# Patient Record
Sex: FEMALE | Race: WHITE | NOT HISPANIC OR LATINO | Employment: OTHER | URBAN - METROPOLITAN AREA
[De-identification: names, ages, dates, MRNs, and addresses within clinical notes are randomized per-mention and may not be internally consistent; named-entity substitution may affect disease eponyms.]

---

## 2023-10-23 DIAGNOSIS — E55.9 VITAMIN D DEFICIENCY: ICD-10-CM

## 2023-10-23 DIAGNOSIS — Z00.00 WELLNESS EXAMINATION: ICD-10-CM

## 2023-10-23 DIAGNOSIS — R79.9 ABNORMAL BLOOD CHEMISTRY: ICD-10-CM

## 2023-10-24 ENCOUNTER — LAB (OUTPATIENT)
Dept: LAB | Facility: LAB | Age: 78
End: 2023-10-24
Payer: MEDICARE

## 2023-10-24 DIAGNOSIS — E55.9 VITAMIN D DEFICIENCY: ICD-10-CM

## 2023-10-24 DIAGNOSIS — Z00.00 WELLNESS EXAMINATION: ICD-10-CM

## 2023-10-24 DIAGNOSIS — R79.9 ABNORMAL BLOOD CHEMISTRY: ICD-10-CM

## 2023-10-24 LAB
25(OH)D3 SERPL-MCNC: 88 NG/ML (ref 30–100)
ALT SERPL W P-5'-P-CCNC: 14 U/L (ref 7–45)
ANION GAP SERPL CALC-SCNC: 13 MMOL/L (ref 10–20)
APPEARANCE UR: CLEAR
BILIRUB UR STRIP.AUTO-MCNC: NEGATIVE MG/DL
BUN SERPL-MCNC: 15 MG/DL (ref 6–23)
CALCIUM SERPL-MCNC: 9.6 MG/DL (ref 8.6–10.6)
CHLORIDE SERPL-SCNC: 102 MMOL/L (ref 98–107)
CHOLEST SERPL-MCNC: 222 MG/DL (ref 0–199)
CHOLESTEROL/HDL RATIO: 2.5
CO2 SERPL-SCNC: 30 MMOL/L (ref 21–32)
COLOR UR: YELLOW
CREAT SERPL-MCNC: 0.68 MG/DL (ref 0.5–1.05)
ERYTHROCYTE [DISTWIDTH] IN BLOOD BY AUTOMATED COUNT: 13.4 % (ref 11.5–14.5)
EST. AVERAGE GLUCOSE BLD GHB EST-MCNC: 120 MG/DL
GFR SERPL CREATININE-BSD FRML MDRD: 89 ML/MIN/1.73M*2
GLUCOSE SERPL-MCNC: 92 MG/DL (ref 74–99)
GLUCOSE UR STRIP.AUTO-MCNC: NEGATIVE MG/DL
HBA1C MFR BLD: 5.8 %
HCT VFR BLD AUTO: 38.9 % (ref 36–46)
HDLC SERPL-MCNC: 88.3 MG/DL
HGB BLD-MCNC: 12.3 G/DL (ref 12–16)
KETONES UR STRIP.AUTO-MCNC: NEGATIVE MG/DL
LDLC SERPL CALC-MCNC: 123 MG/DL
LEUKOCYTE ESTERASE UR QL STRIP.AUTO: NEGATIVE
MCH RBC QN AUTO: 28.2 PG (ref 26–34)
MCHC RBC AUTO-ENTMCNC: 31.6 G/DL (ref 32–36)
MCV RBC AUTO: 89 FL (ref 80–100)
NITRITE UR QL STRIP.AUTO: NEGATIVE
NON HDL CHOLESTEROL: 134 MG/DL (ref 0–149)
NRBC BLD-RTO: 0 /100 WBCS (ref 0–0)
PH UR STRIP.AUTO: 7 [PH]
PLATELET # BLD AUTO: 198 X10*3/UL (ref 150–450)
PMV BLD AUTO: 11.4 FL (ref 7.5–11.5)
POTASSIUM SERPL-SCNC: 4.9 MMOL/L (ref 3.5–5.3)
PROT UR STRIP.AUTO-MCNC: NEGATIVE MG/DL
RBC # BLD AUTO: 4.36 X10*6/UL (ref 4–5.2)
RBC # UR STRIP.AUTO: NEGATIVE /UL
SODIUM SERPL-SCNC: 140 MMOL/L (ref 136–145)
SP GR UR STRIP.AUTO: 1.01
TRIGL SERPL-MCNC: 53 MG/DL (ref 0–149)
TSH SERPL-ACNC: 1.87 MIU/L (ref 0.44–3.98)
UROBILINOGEN UR STRIP.AUTO-MCNC: <2 MG/DL
VIT B12 SERPL-MCNC: 272 PG/ML (ref 211–911)
VLDL: 11 MG/DL (ref 0–40)
WBC # BLD AUTO: 4.6 X10*3/UL (ref 4.4–11.3)

## 2023-10-24 PROCEDURE — 83036 HEMOGLOBIN GLYCOSYLATED A1C: CPT

## 2023-10-24 PROCEDURE — 80061 LIPID PANEL: CPT

## 2023-10-24 PROCEDURE — 84460 ALANINE AMINO (ALT) (SGPT): CPT

## 2023-10-24 PROCEDURE — 81003 URINALYSIS AUTO W/O SCOPE: CPT

## 2023-10-24 PROCEDURE — 82306 VITAMIN D 25 HYDROXY: CPT

## 2023-10-24 PROCEDURE — 84443 ASSAY THYROID STIM HORMONE: CPT

## 2023-10-24 PROCEDURE — 36415 COLL VENOUS BLD VENIPUNCTURE: CPT

## 2023-10-24 PROCEDURE — 85027 COMPLETE CBC AUTOMATED: CPT

## 2023-10-24 PROCEDURE — 80048 BASIC METABOLIC PNL TOTAL CA: CPT

## 2023-10-24 PROCEDURE — 82607 VITAMIN B-12: CPT

## 2023-10-27 RX ORDER — ACETAMINOPHEN 500 MG
TABLET ORAL
COMMUNITY
Start: 2021-06-28

## 2023-10-27 RX ORDER — CALCIUM CARBONATE 600 MG
TABLET ORAL
COMMUNITY
Start: 2021-06-28 | End: 2023-10-31 | Stop reason: WASHOUT

## 2023-10-30 ENCOUNTER — OFFICE VISIT (OUTPATIENT)
Dept: PRIMARY CARE | Facility: CLINIC | Age: 78
End: 2023-10-30
Payer: MEDICARE

## 2023-10-30 VITALS
WEIGHT: 121.2 LBS | TEMPERATURE: 97.5 F | DIASTOLIC BLOOD PRESSURE: 68 MMHG | RESPIRATION RATE: 16 BRPM | SYSTOLIC BLOOD PRESSURE: 110 MMHG | HEART RATE: 68 BPM | BODY MASS INDEX: 20.69 KG/M2 | HEIGHT: 64 IN | OXYGEN SATURATION: 98 %

## 2023-10-30 DIAGNOSIS — Z12.31 ENCOUNTER FOR SCREENING MAMMOGRAM FOR MALIGNANT NEOPLASM OF BREAST: ICD-10-CM

## 2023-10-30 DIAGNOSIS — Z00.00 WELL ADULT EXAM: Primary | ICD-10-CM

## 2023-10-30 DIAGNOSIS — Z12.39 SCREENING BREAST EXAMINATION: ICD-10-CM

## 2023-10-30 PROCEDURE — 1159F MED LIST DOCD IN RCRD: CPT | Performed by: INTERNAL MEDICINE

## 2023-10-30 PROCEDURE — G0439 PPPS, SUBSEQ VISIT: HCPCS | Performed by: INTERNAL MEDICINE

## 2023-10-30 PROCEDURE — 1170F FXNL STATUS ASSESSED: CPT | Performed by: INTERNAL MEDICINE

## 2023-10-30 PROCEDURE — 1036F TOBACCO NON-USER: CPT | Performed by: INTERNAL MEDICINE

## 2023-10-30 ASSESSMENT — ACTIVITIES OF DAILY LIVING (ADL)
DRESSING: INDEPENDENT
DOING_HOUSEWORK: INDEPENDENT
TAKING_MEDICATION: INDEPENDENT
MANAGING_FINANCES: INDEPENDENT
GROCERY_SHOPPING: INDEPENDENT
BATHING: INDEPENDENT

## 2023-10-30 ASSESSMENT — PATIENT HEALTH QUESTIONNAIRE - PHQ9
10. IF YOU CHECKED OFF ANY PROBLEMS, HOW DIFFICULT HAVE THESE PROBLEMS MADE IT FOR YOU TO DO YOUR WORK, TAKE CARE OF THINGS AT HOME, OR GET ALONG WITH OTHER PEOPLE: NOT DIFFICULT AT ALL
1. LITTLE INTEREST OR PLEASURE IN DOING THINGS: NOT AT ALL
8. MOVING OR SPEAKING SO SLOWLY THAT OTHER PEOPLE COULD HAVE NOTICED. OR THE OPPOSITE, BEING SO FIGETY OR RESTLESS THAT YOU HAVE BEEN MOVING AROUND A LOT MORE THAN USUAL: NOT AT ALL
4. FEELING TIRED OR HAVING LITTLE ENERGY: NOT AT ALL
5. POOR APPETITE OR OVEREATING: NOT AT ALL
9. THOUGHTS THAT YOU WOULD BE BETTER OFF DEAD, OR OF HURTING YOURSELF: NOT AT ALL
7. TROUBLE CONCENTRATING ON THINGS, SUCH AS READING THE NEWSPAPER OR WATCHING TELEVISION: NOT AT ALL
3. TROUBLE FALLING OR STAYING ASLEEP OR SLEEPING TOO MUCH: NOT AT ALL
6. FEELING BAD ABOUT YOURSELF - OR THAT YOU ARE A FAILURE OR HAVE LET YOURSELF OR YOUR FAMILY DOWN: NOT AT ALL
SUM OF ALL RESPONSES TO PHQ9 QUESTIONS 1 AND 2: 0
SUM OF ALL RESPONSES TO PHQ QUESTIONS 1-9: 0
2. FEELING DOWN, DEPRESSED OR HOPELESS: NOT AT ALL

## 2023-10-30 ASSESSMENT — ENCOUNTER SYMPTOMS
OCCASIONAL FEELINGS OF UNSTEADINESS: 0
LOSS OF SENSATION IN FEET: 0
DEPRESSION: 0

## 2023-10-30 NOTE — PROGRESS NOTES
Patient is here for her annual medicare wellness exam , has some hereditary concerns with a hole in her aorta , brother was recently diagnosed , interested in varicose veins being handled, and has a pain that comes and goes up back side of thigh sometimes hard to stand but does exercise frequently. Wants to discuss white blood count in labs, states it was elevated   Currently not on any prescribed medications   No Pain

## 2023-10-30 NOTE — PROGRESS NOTES
"Subjective   Patient ID: Danette Owens is a 78 y.o. female who presents for Medicare Annual Wellness Visit Subsequent.  Patient comes in for a physical examination, doing well over - all with no particular complaints.   Also in for laboratory review and health maintenance update.  Updating family history as well.          Review of Systems   All other systems reviewed and are negative.      Objective   Physical Exam  Constitutional:       Appearance: Normal appearance.   HENT:      Head: Normocephalic.   Eyes:      Extraocular Movements: Extraocular movements intact.      Conjunctiva/sclera: Conjunctivae normal.      Pupils: Pupils are equal, round, and reactive to light.   Cardiovascular:      Rate and Rhythm: Normal rate and regular rhythm.   Pulmonary:      Effort: Pulmonary effort is normal.      Breath sounds: Normal breath sounds.   Abdominal:      General: Abdomen is flat. Bowel sounds are normal.      Palpations: Abdomen is soft.   Musculoskeletal:         General: Normal range of motion.      Cervical back: Normal range of motion.   Skin:     General: Skin is warm and dry.   Neurological:      General: No focal deficit present.      Mental Status: She is alert and oriented to person, place, and time. Mental status is at baseline.   Psychiatric:         Mood and Affect: Mood normal.         Behavior: Behavior normal.       /68 (BP Location: Right arm)   Pulse 68   Temp 36.4 °C (97.5 °F)   Resp 16   Ht 1.626 m (5' 4\")   Wt 55 kg (121 lb 3.2 oz)   SpO2 98%   BMI 20.80 kg/m²         Assessment/Plan   Problem List Items Addressed This Visit    None       ASSESSMENT AND PLAN: Patient on examination is in fair health except for medical conditions discussed above, obtained screening blood tests to screen for high cholesterol, diabetes, thyroid, Ekg if above 50 years old or earlier if with cardiac history. Patient should be taking enough calcium in a balanced diet  Weight control especially if BMI is " above ideal range. For Female Patients Only:  Mammogram yearly and PAP test with gynecology unless s/p Total hysterectomy  Bone density test at age 60 and above and every two years after that. Preventive Medicine: colon cancer screening by age 45 if no family history as well PSA @ 50 , balanced diet, and exercise as discussed. Seat belt use for injury prevention, living will. Substance use and /or tobacco use counseled when applicable. Alcohol use discussed, use designated . Immunizations TD age 50 and every 10 years. Pneumovax and shingles vaccine counseled. Yearly flu vaccine unless contraindicated. More than 50% of office visit time spent counseling the patient, questions were answered. If problems arise, patient is to call or come back in. It is understood that the responsibility of healthcare is shared by the patient by following a healthy lifestyle and following the plan above as discussed. Advised complete physical examination every year. Pending additional results, may need to come for a return office visit for follow-up.

## 2023-10-31 ENCOUNTER — TELEPHONE (OUTPATIENT)
Dept: PRIMARY CARE | Facility: CLINIC | Age: 78
End: 2023-10-31
Payer: MEDICARE

## 2023-10-31 NOTE — TELEPHONE ENCOUNTER
Patient called in to let you know her brother, has a hole in the stomach area not chest area, Advise?

## 2023-11-06 ENCOUNTER — ANCILLARY PROCEDURE (OUTPATIENT)
Dept: RADIOLOGY | Facility: CLINIC | Age: 78
End: 2023-11-06
Payer: MEDICARE

## 2023-11-06 DIAGNOSIS — Z12.31 ENCOUNTER FOR SCREENING MAMMOGRAM FOR MALIGNANT NEOPLASM OF BREAST: ICD-10-CM

## 2023-11-06 DIAGNOSIS — Z12.39 SCREENING BREAST EXAMINATION: ICD-10-CM

## 2023-11-06 PROCEDURE — 77067 SCR MAMMO BI INCL CAD: CPT

## 2023-11-06 PROCEDURE — 77063 BREAST TOMOSYNTHESIS BI: CPT | Performed by: STUDENT IN AN ORGANIZED HEALTH CARE EDUCATION/TRAINING PROGRAM

## 2023-11-06 PROCEDURE — 77067 SCR MAMMO BI INCL CAD: CPT | Performed by: STUDENT IN AN ORGANIZED HEALTH CARE EDUCATION/TRAINING PROGRAM

## 2023-11-08 PROBLEM — U07.1 COVID-19 VIRUS DETECTED: Status: ACTIVE | Noted: 2023-11-08

## 2023-11-08 PROBLEM — E03.9 ACQUIRED HYPOTHYROIDISM: Status: ACTIVE | Noted: 2023-11-08

## 2023-11-08 PROBLEM — H40.003 GLAUCOMA SUSPECT OF BOTH EYES: Status: ACTIVE | Noted: 2022-01-31

## 2023-11-08 PROBLEM — N39.0 URINARY TRACT INFECTION: Status: ACTIVE | Noted: 2023-11-08

## 2023-11-08 PROBLEM — R11.2 NAUSEA & VOMITING: Status: ACTIVE | Noted: 2023-11-08

## 2023-11-08 PROBLEM — R55 VASOVAGAL SYNCOPE: Status: ACTIVE | Noted: 2023-11-08

## 2023-11-08 PROBLEM — K63.5 COLON POLYP: Status: ACTIVE | Noted: 2023-11-08

## 2023-11-08 PROBLEM — Z96.1 PSEUDOPHAKIA OF BOTH EYES: Status: ACTIVE | Noted: 2019-09-17

## 2023-11-08 RX ORDER — CALCIUM CARBONATE 500(1250)
TABLET ORAL
COMMUNITY

## 2023-11-09 ENCOUNTER — OFFICE VISIT (OUTPATIENT)
Dept: VASCULAR SURGERY | Facility: CLINIC | Age: 78
End: 2023-11-09
Payer: MEDICARE

## 2023-11-09 VITALS
BODY MASS INDEX: 20.59 KG/M2 | SYSTOLIC BLOOD PRESSURE: 117 MMHG | DIASTOLIC BLOOD PRESSURE: 66 MMHG | OXYGEN SATURATION: 98 % | HEART RATE: 72 BPM | WEIGHT: 120.6 LBS | HEIGHT: 64 IN

## 2023-11-09 DIAGNOSIS — I83.811 VARICOSE VEINS OF RIGHT LOWER EXTREMITY WITH PAIN: Primary | ICD-10-CM

## 2023-11-09 DIAGNOSIS — Z00.00 WELL ADULT EXAM: ICD-10-CM

## 2023-11-09 PROCEDURE — 99204 OFFICE O/P NEW MOD 45 MIN: CPT | Performed by: SURGERY

## 2023-11-09 PROCEDURE — 1036F TOBACCO NON-USER: CPT | Performed by: SURGERY

## 2023-11-09 PROCEDURE — 99214 OFFICE O/P EST MOD 30 MIN: CPT | Performed by: SURGERY

## 2023-11-09 PROCEDURE — 1159F MED LIST DOCD IN RCRD: CPT | Performed by: SURGERY

## 2023-11-09 PROCEDURE — 1126F AMNT PAIN NOTED NONE PRSNT: CPT | Performed by: SURGERY

## 2023-11-09 ASSESSMENT — PATIENT HEALTH QUESTIONNAIRE - PHQ9
1. LITTLE INTEREST OR PLEASURE IN DOING THINGS: NOT AT ALL
2. FEELING DOWN, DEPRESSED OR HOPELESS: NOT AT ALL
SUM OF ALL RESPONSES TO PHQ9 QUESTIONS 1 AND 2: 0

## 2023-11-09 ASSESSMENT — COLUMBIA-SUICIDE SEVERITY RATING SCALE - C-SSRS
6. HAVE YOU EVER DONE ANYTHING, STARTED TO DO ANYTHING, OR PREPARED TO DO ANYTHING TO END YOUR LIFE?: NO
2. HAVE YOU ACTUALLY HAD ANY THOUGHTS OF KILLING YOURSELF?: NO
1. IN THE PAST MONTH, HAVE YOU WISHED YOU WERE DEAD OR WISHED YOU COULD GO TO SLEEP AND NOT WAKE UP?: NO

## 2023-11-09 ASSESSMENT — ENCOUNTER SYMPTOMS
LOSS OF SENSATION IN FEET: 0
DEPRESSION: 0
OCCASIONAL FEELINGS OF UNSTEADINESS: 0

## 2023-11-09 ASSESSMENT — PAIN SCALES - GENERAL: PAINLEVEL: 0-NO PAIN

## 2023-11-09 NOTE — PROGRESS NOTES
NPV REASON: right leg sx varicose veins; questions re: fam hx of aaa    CURRENT ENCOUNTER:  Danette Owens is 78 y.o. female here for follow up of right leg sx varicose veins; questions re: fam hx of aaa.    3 children and vv worsened with each pregnancy - all went well  Has pain along vv - worse after standing for a prolonged period  No ankle edema, but the veins bulge over the day  Does wear compression and does help  Aching and throbbing - comes on very quickly after just 10-15 minutes of standing  No prior intervention  No h/o thrombosis  No pelvic pain  No prior work up for the legs.   No issues with left leg  Very activity  - walks for about 4 miles a day/bike rides in stationary bikes; yoga - on a daily basis.     +famhx of (brother) 3.5cm aaa -   No other fm hx that she is aware of.       Past Medical History:   Diagnosis Date    Encounter for gynecological examination (general) (routine) without abnormal findings     Encounter for gynecological examination without abnormal finding    Encounter for screening mammogram for malignant neoplasm of breast     Visit for screening mammogram       Meds:   Current Outpatient Medications:     calcium carbonate (Oscal) 500 mg calcium (1,250 mg) tablet, Take by mouth. Calcium 600 mg tabs  take per diected, Disp: , Rfl:     cholecalciferol (Vitamin D-3) 50 mcg (2,000 unit) capsule, Take by mouth., Disp: , Rfl:     Allergies:   Allergies   Allergen Reactions    Sulfa (Sulfonamide Antibiotics) Swelling       ROS:  Review of Systems otherwise unremarkable.    Objective:  Vitals:  Vitals:    11/09/23 1428   BP: 117/66   Pulse: 72   SpO2: 98%        Exam:  radial pulses normal, aorta normal in size without enlargement, femoral artery pulses normal, varicose veins noted, pedal pulses normal both DP's and PT's, color, temperature, sensation in feet normal, no lesions                  Labs:  Lab Results   Component Value Date    WBC 4.6 10/24/2023    WBC 3.9 (L) 06/24/2021    HGB  12.3 10/24/2023    HGB 12.9 06/24/2021    HCT 38.9 10/24/2023    HCT 39.7 06/24/2021    MCV 89 10/24/2023    MCV 90 06/24/2021     10/24/2023     Lab Results   Component Value Date    CREATININE 0.68 10/24/2023    CREATININE 0.61 06/24/2021    BUN 15 10/24/2023    BUN 11 06/24/2021     10/24/2023     06/24/2021    K 4.9 10/24/2023    K 3.9 06/24/2021     10/24/2023     06/24/2021    CO2 30 10/24/2023    CO2 31 06/24/2021         Assessment & Plan:  Danette Owens is 78 y.o. female here for follow up of right leg sx varicose veins; questions re: fam hx of aaa.    RIGHT LEG C2 Varicose Veins  RIGHT LEG PAIN 2, VARICOSE VEINS 3, and USE OF COMPRESSION 3  RIGHT LEG CEAP: C2, SX  RIGHT LEG VCSS SCORE: 8    Left leg NO sx.  Excellent compliance with compression therapy and very active.     1) we will proceed with a right leg venous insufficiency study and then have a virtual visit to discuss results and any potential therapies.   2) we will also get a screening AAA ultrasound given your family history of AAA       Corina Jama MD, MHS, RPVI  , [facility] [employer]  Director, Center for Comprehensive Venous Care, [facility] [place]  Co-Director, Vascular Laboratories, [facility] [place]  Division of Vascular Surgery and Endovascular Therapy  [facility]

## 2023-11-09 NOTE — PATIENT INSTRUCTIONS
It was a pleasure taking care of you today and appreciate your seeing us at our [facility] [place] [facility] - Center for Comprehensive Venous Care    Based on your leg symptoms, venous insufficiency studies and potential for clinical improvement, I am recommeding the followin) we will proceed with a right leg venous insufficiency study and then have a virtual visit to discuss results and any potential therapies.   2) we will also get a screening AAA ultrasound given your family history of AAA     Please call the office with any questions at [phone].   For Vein Center specific questions, particularly insurance related questions of booking your Scheller intervention, you can call [phone] or email at [email or web address]    You can speak directly to my Vein Center Nurse Coordinator, Annie Mae, for specific questions.       Corina Jama MD, MHS, RPVI  , [facility] [employer]  Director, Center for Comprehensive Venous Care, [facility] [place]  Co-Director, Vascular Laboratories, [facility] [place]  Division of Vascular Surgery and Endovascular Therapy  [facility]

## 2023-11-15 ENCOUNTER — HOSPITAL ENCOUNTER (OUTPATIENT)
Dept: RADIOLOGY | Facility: EXTERNAL LOCATION | Age: 78
Discharge: HOME | End: 2023-11-15
Payer: MEDICARE

## 2023-12-05 ENCOUNTER — HOSPITAL ENCOUNTER (OUTPATIENT)
Dept: VASCULAR MEDICINE | Facility: HOSPITAL | Age: 78
Discharge: HOME | End: 2023-12-05
Payer: MEDICARE

## 2023-12-05 DIAGNOSIS — Z00.00 WELL ADULT EXAM: ICD-10-CM

## 2023-12-05 DIAGNOSIS — Z13.6 ENCOUNTER FOR SCREENING FOR CARDIOVASCULAR DISORDERS: ICD-10-CM

## 2023-12-05 DIAGNOSIS — I83.811 VARICOSE VEINS OF RIGHT LOWER EXTREMITY WITH PAIN: ICD-10-CM

## 2023-12-05 PROCEDURE — 76706 US ABDL AORTA SCREEN AAA: CPT | Performed by: INTERNAL MEDICINE

## 2023-12-05 PROCEDURE — 93971 EXTREMITY STUDY: CPT

## 2023-12-05 PROCEDURE — 76706 US ABDL AORTA SCREEN AAA: CPT

## 2023-12-05 PROCEDURE — 93971 EXTREMITY STUDY: CPT | Performed by: INTERNAL MEDICINE

## 2024-03-14 ENCOUNTER — TELEMEDICINE (OUTPATIENT)
Dept: VASCULAR SURGERY | Facility: CLINIC | Age: 79
End: 2024-03-14
Payer: MEDICARE

## 2024-03-14 DIAGNOSIS — I83.811 VARICOSE VEINS OF RIGHT LOWER EXTREMITY WITH PAIN: Primary | ICD-10-CM

## 2024-03-14 PROCEDURE — 99442 PR PHYS/QHP TELEPHONE EVALUATION 11-20 MIN: CPT | Performed by: SURGERY

## 2024-03-14 PROCEDURE — 1036F TOBACCO NON-USER: CPT | Performed by: SURGERY

## 2024-03-14 PROCEDURE — 1159F MED LIST DOCD IN RCRD: CPT | Performed by: SURGERY

## 2024-03-14 ASSESSMENT — COLUMBIA-SUICIDE SEVERITY RATING SCALE - C-SSRS
1. IN THE PAST MONTH, HAVE YOU WISHED YOU WERE DEAD OR WISHED YOU COULD GO TO SLEEP AND NOT WAKE UP?: NO
6. HAVE YOU EVER DONE ANYTHING, STARTED TO DO ANYTHING, OR PREPARED TO DO ANYTHING TO END YOUR LIFE?: NO
2. HAVE YOU ACTUALLY HAD ANY THOUGHTS OF KILLING YOURSELF?: NO

## 2024-03-14 ASSESSMENT — PATIENT HEALTH QUESTIONNAIRE - PHQ9
2. FEELING DOWN, DEPRESSED OR HOPELESS: NOT AT ALL
SUM OF ALL RESPONSES TO PHQ9 QUESTIONS 1 AND 2: 0
1. LITTLE INTEREST OR PLEASURE IN DOING THINGS: NOT AT ALL

## 2024-03-14 NOTE — PROGRESS NOTES
F/U REASON: follow up on VI studies for right leg sx varicose veins    CURRENT ENCOUNTER:  Danette Owens is 79 y.o. female here for follow up of follow up on VI studies for right leg sx varicose veins.    Most of her pain is when she stands in position - she feels the most   Sx at that time;   Compression socks are helping  Leaving for [place] may 8th. So likely best to undergo therapies upon return.   No other interval changes    Meds:   Current Outpatient Medications:     calcium carbonate (Oscal) 500 mg calcium (1,250 mg) tablet, Take by mouth. Calcium 600 mg tabs  take per diected, Disp: , Rfl:     cholecalciferol (Vitamin D-3) 50 mcg (2,000 unit) capsule, Take by mouth., Disp: , Rfl:     Allergies:   Allergies   Allergen Reactions    Sulfa (Sulfonamide Antibiotics) Swelling       ROS:  Review of Systems    otherwise unremarkable    Objective:  Vitals:  There were no vitals filed for this visit.       Labs:  Lab Results   Component Value Date    WBC 4.6 10/24/2023    WBC 3.9 (L) 06/24/2021    HGB 12.3 10/24/2023    HGB 12.9 06/24/2021    HCT 38.9 10/24/2023    HCT 39.7 06/24/2021    MCV 89 10/24/2023    MCV 90 06/24/2021     10/24/2023     Lab Results   Component Value Date    CREATININE 0.68 10/24/2023    CREATININE 0.61 06/24/2021    BUN 15 10/24/2023    BUN 11 06/24/2021     10/24/2023     06/24/2021    K 4.9 10/24/2023    K 3.9 06/24/2021     10/24/2023     06/24/2021    CO2 30 10/24/2023    CO2 31 06/24/2021         Imaging:  VI scan reviewed: small caliber GSV with minimal reflux  AAA - none on duplex    Assessment & Plan:  Danette Owens is 79 y.o. female here for follow up of right leg sx varicose veins; questions re: fam hx of aaa.     RIGHT LEG C2 Varicose Veins  RIGHT LEG PAIN 2, VARICOSE VEINS 3, and USE OF COMPRESSION 3  RIGHT LEG CEAP: C2, SX  RIGHT LEG VCSS SCORE: 8     Left leg NO sx.  Excellent compliance with compression therapy and very active.     Most of her pain  is when she stands in position - she feels the most   Sx at that time;   Compression socks are helping  Leaving for [place] may 8th. So likely best to undergo therapies upon return.   No other interval changes     1) plan is for right leg varithena x2 to the symptomatic vv upon return from her overseas trip      Corina Jama MD, MHS, RPVI  , [facility] [employer]  Director, Center for Comprehensive Venous Care, [facility] [place]  Co-Director, Vascular Laboratories, [facility] [place]  Division of Vascular Surgery and Endovascular Therapy  [facility]

## 2024-03-14 NOTE — PATIENT INSTRUCTIONS
It was a pleasure taking care of you today and appreciate your seeing us at our [facility] [place] [facility].     Today's plan is as follows:  1) plan is for right leg varithena x2 upon return from her overseas trip        Please call the office with any questions at [phone].   You can speak to our secretaries or our clinical nurses for specific questions.   For Vein Center specific questions, you can also call [phone] or email at [email or web address]  If you need coordinating your appointments and testing you can do these at the  or by calling my office shortly after your visit.

## 2024-04-02 DIAGNOSIS — I83.811 VARICOSE VEINS OF RIGHT LOWER EXTREMITY WITH PAIN: ICD-10-CM

## 2024-05-28 ENCOUNTER — APPOINTMENT (OUTPATIENT)
Dept: VASCULAR SURGERY | Facility: CLINIC | Age: 79
End: 2024-05-28
Payer: MEDICARE

## 2024-06-04 ENCOUNTER — PROCEDURE VISIT (OUTPATIENT)
Dept: VASCULAR SURGERY | Facility: CLINIC | Age: 79
End: 2024-06-04
Payer: MEDICARE

## 2024-06-04 VITALS
BODY MASS INDEX: 20.1 KG/M2 | RESPIRATION RATE: 18 BRPM | DIASTOLIC BLOOD PRESSURE: 61 MMHG | SYSTOLIC BLOOD PRESSURE: 117 MMHG | HEART RATE: 70 BPM | WEIGHT: 117.1 LBS

## 2024-06-04 DIAGNOSIS — I83.811 VARICOSE VEINS OF RIGHT LOWER EXTREMITY WITH PAIN: Primary | ICD-10-CM

## 2024-06-04 PROCEDURE — 36465 NJX NONCMPND SCLRSNT 1 VEIN: CPT | Performed by: SURGERY

## 2024-06-04 ASSESSMENT — ENCOUNTER SYMPTOMS
LOSS OF SENSATION IN FEET: 0
DEPRESSION: 0
OCCASIONAL FEELINGS OF UNSTEADINESS: 0

## 2024-06-04 NOTE — PROGRESS NOTES
Patient was seen today for administration of VARITHENA to right leg at the level of  mid calf.     The patient was given an explanation of the protocol for administering Varithena risks and benefits were explained to the patient. After consent was obtained, the patient was positioned on the exam table in reverse trendelenberg and the right leg was prepped and draped (after tributaries and perforators were identified and marked). The veins requiring treatment were identified and measured/marked. The vein was cannulated using US guidance and venous access was confirmed. Once access was obtained, the patient was placed in steep trendelenberg for 3 minutes to drain the veins with a foam wedge was placed at foot for elevation of leg.     Aseptic technique was used along with the 's recommendation for product handling, a total of 11 cc was administered over a 1 access point with 2 administrations allowing the varithena to flow antegrade and retrograde with digit compression guidance.  While the drug was administered, we compressed the distal inflow vein and the perforators - the foam was observed as it travelled up the desired veins to be treated. During this time the ankle was also flexed to help compress the perforators.     ECHOSCLEROTHERAPY: Yes  MAX VEIN SIZE TREATED: 3.2mm    Physical Exam  Musculoskeletal:        Legs:      After 2 minutes of compression, the ankle was then pumped for 30 pumps to help the foam travel cephalad. After treatment was completed, the access cannula/needle was removed and compression was held. We checked for any DVT or foam along the treated vein path and this was negative. Steri strip was applied to the access site(s) and then kerlix/ and compression pads/dressing was applied to the leg.     No reactions occurred during the administration nor while remaining in the clinic for about 10 minutes.   Wrap will stay on for 48 hours. patient will avoid heavy exercises for 7  days and wear compression stockings on the treated leg for 2 weeks, avoid inactivity and do daily walking.   Follow up will be in 7 days with a repeat duplex in our vascular lab.    STAFF: DAGBOERTO NEWTON  START: 1112  STOP: 1122    Notable Findings: Numerous reticular branches from GSV successfully treated today    Juan Manuel Jernigan MD  Vascular Surgery Fellow    Danette Owens  seen and examined with above student/resident/fellow.   Key portions of history and physical exam personally reviewed and verified.   I reviewed most updated imaging and objective data related to this patient's procedure.   Agree with procedure note.     Corina Newton MD, MHS, RPVI  , [facility] [employer]  Director, Center for Comprehensive Venous Care, [facility] [place]  Co-Director, Vascular Laboratories, [facility] [place]  Division of Vascular Surgery and Endovascular Therapy  [facility]

## 2024-06-11 ENCOUNTER — HOSPITAL ENCOUNTER (OUTPATIENT)
Dept: VASCULAR MEDICINE | Facility: CLINIC | Age: 79
Discharge: HOME | End: 2024-06-11
Payer: MEDICARE

## 2024-06-11 DIAGNOSIS — I83.811 VARICOSE VEINS OF RIGHT LOWER EXTREMITY WITH PAIN: ICD-10-CM

## 2024-06-11 PROCEDURE — 93971 EXTREMITY STUDY: CPT | Performed by: SURGERY

## 2024-06-11 PROCEDURE — 93971 EXTREMITY STUDY: CPT

## 2024-06-18 ENCOUNTER — PROCEDURE VISIT (OUTPATIENT)
Dept: VASCULAR SURGERY | Facility: CLINIC | Age: 79
End: 2024-06-18
Payer: MEDICARE

## 2024-06-18 VITALS
DIASTOLIC BLOOD PRESSURE: 76 MMHG | WEIGHT: 119.7 LBS | RESPIRATION RATE: 16 BRPM | OXYGEN SATURATION: 94 % | SYSTOLIC BLOOD PRESSURE: 123 MMHG | BODY MASS INDEX: 20.55 KG/M2

## 2024-06-18 DIAGNOSIS — I83.811 VARICOSE VEINS OF RIGHT LOWER EXTREMITY WITH PAIN: ICD-10-CM

## 2024-06-18 PROCEDURE — 36465 NJX NONCMPND SCLRSNT 1 VEIN: CPT | Performed by: SURGERY

## 2024-06-18 NOTE — PROGRESS NOTES
Patient was seen today for administration of VARITHENA to right leg at the level of  distal thigh/ proximal calf.     The patient was given an explanation of the protocol for administering Varithena risks and benefits were explained to the patient. After consent was obtained, the patient was positioned on the exam table in reverse trendelenberg and the right leg was prepped and draped (after tributaries and perforators were identified and marked). The veins requiring treatment were identified and measured/marked. The vein was cannulated using US guidance and venous access was confirmed. Once access was obtained, the patient was placed in steep trendelenberg for 3 minutes to drain the veins with a foam wedge was placed at foot for elevation of leg.     Aseptic technique was used along with the 's recommendation for product handling, a total of 5 cc was administered over a 1 access point with 1 administrations allowing the varithena to flow antegrade and retrograde with digit compression guidance.  While the drug was administered, we compressed the distal inflow vein and the perforators - the foam was observed as it travelled up the desired veins to be treated. During this time the ankle was also flexed to help compress the perforators.     ECHOSCLEROTHERAPY: yes  MAX VEIN SIZE TREATED: 3 mm    Physical Exam  Musculoskeletal:        Legs:          After 2 minutes of compression, the ankle was then pumped for 30 pumps to help the foam travel cephalad. After treatment was completed, the access cannula/needle was removed and compression was held. We checked for any DVT or foam along the treated vein path and this was negative. Steri strip was applied to the access site(s) and then kerlix/ and compression pads/dressing was applied to the leg.     No reactions occurred during the administration nor while remaining in the clinic for about 10 minutes.   Wrap will stay on for 48 hours. patient will avoid  heavy exercises for 7 days and wear compression stockings on the treated leg for 2 weeks, avoid inactivity and do daily walking.   Follow up will be in 7 days with a repeat duplex in our vascular lab.    STAFF: DAGOBERTO NEWTON  START: 1050  STOP: 1102    Notable Findings: prior intervention shows closure of the treated veins, today we treated some areas posterior to last session's treatment; drained two areas from prior intervention, just below the knee    Likely complete with interventions, will see how patient responds clinically    Juan Manuel Jernigan MD  Vascular Surgery Fellow    I was present for procedure  Agree with above and plan    Corina Newton MD, MHS, RPVI  , [facility] [employer]  Director, Center for Comprehensive Venous Care, [facility] [place]  Co-Director, Vascular Laboratories, [facility] [place]  Division of Vascular Surgery and Endovascular Therapy  [facility]

## 2024-06-25 ENCOUNTER — HOSPITAL ENCOUNTER (OUTPATIENT)
Dept: VASCULAR MEDICINE | Facility: CLINIC | Age: 79
Discharge: HOME | End: 2024-06-25
Payer: MEDICARE

## 2024-06-25 DIAGNOSIS — I83.811 VARICOSE VEINS OF RIGHT LOWER EXTREMITY WITH PAIN: ICD-10-CM

## 2024-06-25 PROCEDURE — 93971 EXTREMITY STUDY: CPT

## 2024-07-02 ENCOUNTER — OFFICE VISIT (OUTPATIENT)
Dept: VASCULAR SURGERY | Facility: CLINIC | Age: 79
End: 2024-07-02
Payer: MEDICARE

## 2024-07-02 VITALS
BODY MASS INDEX: 20.54 KG/M2 | SYSTOLIC BLOOD PRESSURE: 112 MMHG | HEART RATE: 78 BPM | HEIGHT: 64 IN | WEIGHT: 120.3 LBS | DIASTOLIC BLOOD PRESSURE: 68 MMHG

## 2024-07-02 DIAGNOSIS — I83.811 VARICOSE VEINS OF RIGHT LOWER EXTREMITY WITH PAIN: Primary | ICD-10-CM

## 2024-07-02 PROCEDURE — 1159F MED LIST DOCD IN RCRD: CPT | Performed by: NURSE PRACTITIONER

## 2024-07-02 PROCEDURE — 1126F AMNT PAIN NOTED NONE PRSNT: CPT | Performed by: NURSE PRACTITIONER

## 2024-07-02 PROCEDURE — 99214 OFFICE O/P EST MOD 30 MIN: CPT | Performed by: NURSE PRACTITIONER

## 2024-07-02 PROCEDURE — 1036F TOBACCO NON-USER: CPT | Performed by: NURSE PRACTITIONER

## 2024-07-02 ASSESSMENT — ENCOUNTER SYMPTOMS
SHORTNESS OF BREATH: 0
LOSS OF SENSATION IN FEET: 0
NEUROLOGICAL NEGATIVE: 1
ENDOCRINE NEGATIVE: 1
DEPRESSION: 0
GASTROINTESTINAL NEGATIVE: 1
ALLERGIC/IMMUNOLOGIC NEGATIVE: 1
RESPIRATORY NEGATIVE: 1
OCCASIONAL FEELINGS OF UNSTEADINESS: 0
PSYCHIATRIC NEGATIVE: 1
MUSCULOSKELETAL NEGATIVE: 1
EYES NEGATIVE: 1
CONSTITUTIONAL NEGATIVE: 1
PALPITATIONS: 0

## 2024-07-02 ASSESSMENT — PATIENT HEALTH QUESTIONNAIRE - PHQ9
2. FEELING DOWN, DEPRESSED OR HOPELESS: NOT AT ALL
1. LITTLE INTEREST OR PLEASURE IN DOING THINGS: NOT AT ALL
SUM OF ALL RESPONSES TO PHQ9 QUESTIONS 1 AND 2: 0

## 2024-07-02 ASSESSMENT — PAIN SCALES - GENERAL: PAINLEVEL: 0-NO PAIN

## 2024-07-02 ASSESSMENT — COLUMBIA-SUICIDE SEVERITY RATING SCALE - C-SSRS
2. HAVE YOU ACTUALLY HAD ANY THOUGHTS OF KILLING YOURSELF?: NO
6. HAVE YOU EVER DONE ANYTHING, STARTED TO DO ANYTHING, OR PREPARED TO DO ANYTHING TO END YOUR LIFE?: NO
1. IN THE PAST MONTH, HAVE YOU WISHED YOU WERE DEAD OR WISHED YOU COULD GO TO SLEEP AND NOT WAKE UP?: NO

## 2024-07-02 NOTE — PROGRESS NOTES
"F/U REASON: varicose veins    CURRENT ENCOUNTER:  Danette Owens is 79 y.o. female here for follow up of aching, painful reticular veins, right worse than left.     She now is without pain and comfortable. 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 Calcium 600 mg tabs  take per diected, Disp: , Rfl:     cholecalciferol (Vitamin D-3) 50 mcg (2,000 unit) capsule, Take by mouth., Disp: , Rfl:     Allergies:   Allergies   Allergen Reactions    Sulfa (Sulfonamide Antibiotics) Swelling     ROS:  Review of Systems   Constitutional: Negative.    HENT: Negative.     Eyes: Negative.    Respiratory: Negative.  Negative for shortness of breath.    Cardiovascular:  Negative for chest pain and palpitations.   Gastrointestinal: Negative.    Endocrine: Negative.    Genitourinary: Negative.    Musculoskeletal: Negative.    Skin: Negative.    Allergic/Immunologic: Negative.    Neurological: Negative.    Psychiatric/Behavioral: Negative.       otherwise unremarkable    Objective:  Vitals:  Vitals:    07/02/24 1313   BP: 112/68   Pulse:    /68 (BP Location: Left arm, Patient Position: Sitting, BP Cuff Size: Adult)   Pulse 78   Ht 1.626 m (5' 4\")   Wt 54.6 kg (120 lb 4.8 oz)   BMI 20.65 kg/m²      Physical Exam  Constitutional:       Appearance: Normal appearance.   HENT:      Head: Normocephalic and atraumatic.      Nose: Nose normal.      Mouth/Throat:      Mouth: Mucous membranes are moist.   Eyes:      Conjunctiva/sclera: Conjunctivae normal.   Cardiovascular:      Rate and Rhythm: Normal rate.      Pulses: " Normal pulses.   Pulmonary:      Effort: Pulmonary effort is normal.   Musculoskeletal:         General: Normal range of motion.      Cervical back: Normal range of motion.   Skin:     General: Skin is warm and dry.      Capillary Refill: Capillary refill takes less than 2 seconds.   Neurological:      General: No focal deficit present.      Mental Status: She is alert and oriented to person, place, and time.     Labs:  Lab Results   Component Value Date    WBC 4.6 10/24/2023    WBC 3.9 (L) 06/24/2021    HGB 12.3 10/24/2023    HGB 12.9 06/24/2021    HCT 38.9 10/24/2023    HCT 39.7 06/24/2021    MCV 89 10/24/2023    MCV 90 06/24/2021     10/24/2023     Lab Results   Component Value Date    CREATININE 0.68 10/24/2023    CREATININE 0.61 06/24/2021    BUN 15 10/24/2023    BUN 11 06/24/2021     10/24/2023     06/24/2021    K 4.9 10/24/2023    K 3.9 06/24/2021     10/24/2023     06/24/2021    CO2 30 10/24/2023    CO2 31 06/24/2021       Imaging:                  Assessment & Plan:  Danette Owens is 79 y.o. female who is presents s/p venous procedures for painful varicose veins of the right leg    6/4/24: R mid calf Varithena  6/18/24: R distal thigh/prox calf Varithena- distal  close with foam    11/2023  RIGHT LEG C2 Varicose Veins  RIGHT LEG PAIN 2, VARICOSE VEINS 3, and USE OF COMPRESSION 3  RIGHT LEG CEAP: C2, SX  RIGHT LEG VCSS SCORE: 8    Today's plan is as follows:  1) Continue to wear medical grade compression stockings  2) Elevate your legs at rest  3) Follow-up me in 3 months  4) May use Arnica topically to the bruising sites    Kendra Bhatia, TRAN, APRN-CNP, AGPCNP-C, FNP-C, AGACNP-BC  Senior Nurse Practitioner, Vascular Surgery  Center for Comprehensive Venous Care, [facility] [place]  [address], Office [phone]

## 2024-07-02 NOTE — PATIENT INSTRUCTIONS
It was a pleasure taking care of you today and appreciate your seeing us at our [facility] [place] [facility].     Today's plan is as follows:  1) Continue to wear medical grade compression stockings  2) Elevate your legs at rest  3) Follow-up me in 3 months  4) May use Arnica topically to the bruising sites    Please call the office with any questions at [phone].   You can speak to our secretaries or our clinical nurses for specific questions.   For Vein Center specific questions, you can also call [phone] or email at [email or web address]  If you need coordinating your appointments and testing you can do these at the  or by calling my office shortly after your visit.

## 2024-09-25 ENCOUNTER — APPOINTMENT (OUTPATIENT)
Dept: VASCULAR SURGERY | Facility: CLINIC | Age: 79
End: 2024-09-25
Payer: MEDICARE

## 2024-09-25 VITALS
WEIGHT: 117.5 LBS | SYSTOLIC BLOOD PRESSURE: 102 MMHG | HEIGHT: 64 IN | DIASTOLIC BLOOD PRESSURE: 64 MMHG | BODY MASS INDEX: 20.06 KG/M2

## 2024-09-25 DIAGNOSIS — I83.90 RETICULAR VENOUS VARICES: Primary | ICD-10-CM

## 2024-09-25 PROCEDURE — 1159F MED LIST DOCD IN RCRD: CPT | Performed by: NURSE PRACTITIONER

## 2024-09-25 PROCEDURE — 99214 OFFICE O/P EST MOD 30 MIN: CPT | Performed by: NURSE PRACTITIONER

## 2024-09-25 PROCEDURE — 1036F TOBACCO NON-USER: CPT | Performed by: NURSE PRACTITIONER

## 2024-09-25 ASSESSMENT — ENCOUNTER SYMPTOMS
GASTROINTESTINAL NEGATIVE: 1
LOSS OF SENSATION IN FEET: 0
MUSCULOSKELETAL NEGATIVE: 1
EYES NEGATIVE: 1
RESPIRATORY NEGATIVE: 1
ALLERGIC/IMMUNOLOGIC NEGATIVE: 1
PSYCHIATRIC NEGATIVE: 1
NEUROLOGICAL NEGATIVE: 1
PALPITATIONS: 0
OCCASIONAL FEELINGS OF UNSTEADINESS: 0
DEPRESSION: 0
ENDOCRINE NEGATIVE: 1
CONSTITUTIONAL NEGATIVE: 1
SHORTNESS OF BREATH: 0

## 2024-09-25 ASSESSMENT — COLUMBIA-SUICIDE SEVERITY RATING SCALE - C-SSRS
1. IN THE PAST MONTH, HAVE YOU WISHED YOU WERE DEAD OR WISHED YOU COULD GO TO SLEEP AND NOT WAKE UP?: NO
2. HAVE YOU ACTUALLY HAD ANY THOUGHTS OF KILLING YOURSELF?: NO
6. HAVE YOU EVER DONE ANYTHING, STARTED TO DO ANYTHING, OR PREPARED TO DO ANYTHING TO END YOUR LIFE?: NO

## 2024-09-25 ASSESSMENT — PATIENT HEALTH QUESTIONNAIRE - PHQ9
SUM OF ALL RESPONSES TO PHQ9 QUESTIONS 1 AND 2: 0
2. FEELING DOWN, DEPRESSED OR HOPELESS: NOT AT ALL
1. LITTLE INTEREST OR PLEASURE IN DOING THINGS: NOT AT ALL

## 2024-09-25 NOTE — PROGRESS NOTES
"F/U REASON: sclerotherapy follow up for symptomatic reticular veins    CURRENT ENCOUNTER:  Danette Owens is 79 y.o. female here for follow up of varicose veins.  Right leg aching is 90% improved.     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 Calcium 600 mg tabs  take per diected, Disp: , Rfl:     glucosamine HCl/chondroitin jaquez (GLUCOSAMINE-CHONDROITIN ORAL), Take 1 tablet by mouth once daily., Disp: , Rfl:     Allergies:   Allergies   Allergen Reactions    Sulfa (Sulfonamide Antibiotics) Swelling     ROS:  Review of Systems   Constitutional: Negative.    HENT: Negative.     Eyes: Negative.    Respiratory: Negative.  Negative for shortness of breath.    Cardiovascular:  Negative for chest pain and palpitations.   Gastrointestinal: Negative.    Endocrine: Negative.    Genitourinary: Negative.    Musculoskeletal: Negative.    Skin:         BLE spider veins that sometimes ache R>L   Allergic/Immunologic: Negative.    Neurological: Negative.    Psychiatric/Behavioral: Negative.       otherwise unremarkable    Objective:  Vitals:  Vitals:    09/25/24 1400   BP: 102/64    /64 (BP Location: Left arm)   Ht 1.626 m (5' 4\")   Wt 53.3 kg (117 lb 8 oz)   BMI 20.17 kg/m²     Physical Exam  Constitutional:       Appearance: Normal appearance.   HENT:      Head: Normocephalic and atraumatic.      Nose: Nose normal.      Mouth/Throat:      Mouth: Mucous membranes are moist.   Eyes:      Conjunctiva/sclera: Conjunctivae normal.   Cardiovascular:      Rate and Rhythm: Normal rate.      Pulses: Normal pulses.   Pulmonary:      Effort: Pulmonary effort is normal. "   Musculoskeletal:         General: Normal range of motion.      Cervical back: Normal range of motion.   Skin:     General: Skin is warm and dry.      Capillary Refill: Capillary refill takes less than 2 seconds.      Comments: Reticular veins to BLE medial knees/thigh R>L   Neurological:      General: No focal deficit present.      Mental Status: She is alert and oriented to person, place, and time.   Psychiatric:         Mood and Affect: Mood normal.         Behavior: Behavior normal.         Thought Content: Thought content normal.         Judgment: Judgment normal.     Labs:  Lab Results   Component Value Date    WBC 4.6 10/24/2023    WBC 3.9 (L) 06/24/2021    HGB 12.3 10/24/2023    HGB 12.9 06/24/2021    HCT 38.9 10/24/2023    HCT 39.7 06/24/2021    MCV 89 10/24/2023    MCV 90 06/24/2021     10/24/2023     Lab Results   Component Value Date    CREATININE 0.68 10/24/2023    CREATININE 0.61 06/24/2021    BUN 15 10/24/2023    BUN 11 06/24/2021     10/24/2023     06/24/2021    K 4.9 10/24/2023    K 3.9 06/24/2021     10/24/2023     06/24/2021    CO2 30 10/24/2023    CO2 31 06/24/2021         Imaging:                Assessment & Plan:  Danette Owens is 79 y.o. 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

## 2024-09-25 NOTE — PATIENT INSTRUCTIONS
It was a pleasure taking care of you today and appreciate your seeing us at our [facility].     Today's plan is as follows:  1) Continue to wear compression stockings as needed  2) Elevate your legs at rest  3) Continue staying active  4) Follow-up with me in 1 year    Please call the office with any questions at [phone].   You can speak to our secretaries or our clinical nurses for specific questions.   For [facility] specific questions, you can also call [phone] or email at [email or web address]  If you need coordinating your appointments and testing you can do these at the  or by calling my office shortly after your visit.

## 2024-09-30 DIAGNOSIS — Z00.00 ENCOUNTER FOR ANNUAL WELLNESS EXAM IN MEDICARE PATIENT: ICD-10-CM

## 2024-09-30 DIAGNOSIS — R79.89 ABNORMAL CBC: ICD-10-CM

## 2024-09-30 DIAGNOSIS — E55.9 VITAMIN D DEFICIENCY: ICD-10-CM

## 2024-10-21 ENCOUNTER — APPOINTMENT (OUTPATIENT)
Dept: PRIMARY CARE | Facility: CLINIC | Age: 79
End: 2024-10-21
Payer: MEDICARE

## 2024-10-21 ENCOUNTER — LAB (OUTPATIENT)
Dept: LAB | Facility: LAB | Age: 79
End: 2024-10-21
Payer: MEDICARE

## 2024-10-21 DIAGNOSIS — Z00.00 ENCOUNTER FOR ANNUAL WELLNESS EXAM IN MEDICARE PATIENT: ICD-10-CM

## 2024-10-21 DIAGNOSIS — E55.9 VITAMIN D DEFICIENCY: ICD-10-CM

## 2024-10-21 DIAGNOSIS — R79.89 ABNORMAL CBC: ICD-10-CM

## 2024-10-21 LAB
25(OH)D3 SERPL-MCNC: 60 NG/ML (ref 30–100)
ALT SERPL W P-5'-P-CCNC: 14 U/L (ref 7–45)
ANION GAP SERPL CALC-SCNC: 11 MMOL/L (ref 10–20)
APPEARANCE UR: CLEAR
BASOPHILS # BLD AUTO: 0.02 X10*3/UL (ref 0–0.1)
BASOPHILS NFR BLD AUTO: 0.4 %
BILIRUB UR STRIP.AUTO-MCNC: NEGATIVE MG/DL
BUN SERPL-MCNC: 12 MG/DL (ref 6–23)
CALCIUM SERPL-MCNC: 9.4 MG/DL (ref 8.6–10.6)
CHLORIDE SERPL-SCNC: 101 MMOL/L (ref 98–107)
CHOLEST SERPL-MCNC: 222 MG/DL (ref 0–199)
CHOLESTEROL/HDL RATIO: 2.5
CO2 SERPL-SCNC: 32 MMOL/L (ref 21–32)
COLOR UR: ABNORMAL
CREAT SERPL-MCNC: 0.65 MG/DL (ref 0.5–1.05)
EGFRCR SERPLBLD CKD-EPI 2021: 90 ML/MIN/1.73M*2
EOSINOPHIL # BLD AUTO: 0.09 X10*3/UL (ref 0–0.4)
EOSINOPHIL NFR BLD AUTO: 1.8 %
ERYTHROCYTE [DISTWIDTH] IN BLOOD BY AUTOMATED COUNT: 13.3 % (ref 11.5–14.5)
EST. AVERAGE GLUCOSE BLD GHB EST-MCNC: 111 MG/DL
GLUCOSE SERPL-MCNC: 92 MG/DL (ref 74–99)
GLUCOSE UR STRIP.AUTO-MCNC: NORMAL MG/DL
HBA1C MFR BLD: 5.5 %
HCT VFR BLD AUTO: 39.3 % (ref 36–46)
HDLC SERPL-MCNC: 87.6 MG/DL
HGB BLD-MCNC: 12.6 G/DL (ref 12–16)
IMM GRANULOCYTES # BLD AUTO: 0.01 X10*3/UL (ref 0–0.5)
IMM GRANULOCYTES NFR BLD AUTO: 0.2 % (ref 0–0.9)
KETONES UR STRIP.AUTO-MCNC: NEGATIVE MG/DL
LDLC SERPL CALC-MCNC: 123 MG/DL
LEUKOCYTE ESTERASE UR QL STRIP.AUTO: ABNORMAL
LYMPHOCYTES # BLD AUTO: 1.56 X10*3/UL (ref 0.8–3)
LYMPHOCYTES NFR BLD AUTO: 31.6 %
MCH RBC QN AUTO: 28.6 PG (ref 26–34)
MCHC RBC AUTO-ENTMCNC: 32.1 G/DL (ref 32–36)
MCV RBC AUTO: 89 FL (ref 80–100)
MONOCYTES # BLD AUTO: 0.87 X10*3/UL (ref 0.05–0.8)
MONOCYTES NFR BLD AUTO: 17.6 %
MUCOUS THREADS #/AREA URNS AUTO: ABNORMAL /LPF
NEUTROPHILS # BLD AUTO: 2.38 X10*3/UL (ref 1.6–5.5)
NEUTROPHILS NFR BLD AUTO: 48.4 %
NITRITE UR QL STRIP.AUTO: NEGATIVE
NON HDL CHOLESTEROL: 134 MG/DL (ref 0–149)
NRBC BLD-RTO: 0 /100 WBCS (ref 0–0)
PH UR STRIP.AUTO: 7 [PH]
PLATELET # BLD AUTO: 215 X10*3/UL (ref 150–450)
POTASSIUM SERPL-SCNC: 4.7 MMOL/L (ref 3.5–5.3)
PROT UR STRIP.AUTO-MCNC: NEGATIVE MG/DL
RBC # BLD AUTO: 4.41 X10*6/UL (ref 4–5.2)
RBC # UR STRIP.AUTO: NEGATIVE /UL
RBC #/AREA URNS AUTO: ABNORMAL /HPF
SODIUM SERPL-SCNC: 139 MMOL/L (ref 136–145)
SP GR UR STRIP.AUTO: 1.01
SQUAMOUS #/AREA URNS AUTO: ABNORMAL /HPF
TRIGL SERPL-MCNC: 56 MG/DL (ref 0–149)
TSH SERPL-ACNC: 3.91 MIU/L (ref 0.44–3.98)
UROBILINOGEN UR STRIP.AUTO-MCNC: NORMAL MG/DL
VIT B12 SERPL-MCNC: 230 PG/ML (ref 211–911)
VLDL: 11 MG/DL (ref 0–40)
WBC # BLD AUTO: 4.9 X10*3/UL (ref 4.4–11.3)
WBC #/AREA URNS AUTO: ABNORMAL /HPF

## 2024-10-21 PROCEDURE — 80061 LIPID PANEL: CPT

## 2024-10-21 PROCEDURE — 83036 HEMOGLOBIN GLYCOSYLATED A1C: CPT

## 2024-10-21 PROCEDURE — 85025 COMPLETE CBC W/AUTO DIFF WBC: CPT

## 2024-10-21 PROCEDURE — 82607 VITAMIN B-12: CPT

## 2024-10-21 PROCEDURE — 81001 URINALYSIS AUTO W/SCOPE: CPT

## 2024-10-21 PROCEDURE — 36415 COLL VENOUS BLD VENIPUNCTURE: CPT

## 2024-10-21 PROCEDURE — 84460 ALANINE AMINO (ALT) (SGPT): CPT

## 2024-10-21 PROCEDURE — 82306 VITAMIN D 25 HYDROXY: CPT

## 2024-10-21 PROCEDURE — 80048 BASIC METABOLIC PNL TOTAL CA: CPT

## 2024-10-21 PROCEDURE — 84443 ASSAY THYROID STIM HORMONE: CPT

## 2024-10-25 ENCOUNTER — APPOINTMENT (OUTPATIENT)
Dept: PRIMARY CARE | Facility: CLINIC | Age: 79
End: 2024-10-25
Payer: MEDICARE

## 2024-10-25 VITALS
HEIGHT: 64 IN | HEART RATE: 76 BPM | BODY MASS INDEX: 20.49 KG/M2 | RESPIRATION RATE: 16 BRPM | SYSTOLIC BLOOD PRESSURE: 110 MMHG | OXYGEN SATURATION: 96 % | WEIGHT: 120 LBS | TEMPERATURE: 96 F | DIASTOLIC BLOOD PRESSURE: 70 MMHG

## 2024-10-25 DIAGNOSIS — M81.0 AGE RELATED OSTEOPOROSIS, UNSPECIFIED PATHOLOGICAL FRACTURE PRESENCE: ICD-10-CM

## 2024-10-25 DIAGNOSIS — Z00.00 WELL ADULT EXAM: Primary | ICD-10-CM

## 2024-10-25 DIAGNOSIS — Z23 FLU VACCINE NEED: ICD-10-CM

## 2024-10-25 ASSESSMENT — ACTIVITIES OF DAILY LIVING (ADL)
DRESSING: INDEPENDENT
TAKING_MEDICATION: INDEPENDENT
DOING_HOUSEWORK: INDEPENDENT
BATHING: INDEPENDENT
GROCERY_SHOPPING: INDEPENDENT
MANAGING_FINANCES: INDEPENDENT

## 2024-10-25 ASSESSMENT — PATIENT HEALTH QUESTIONNAIRE - PHQ9
3. TROUBLE FALLING OR STAYING ASLEEP OR SLEEPING TOO MUCH: NOT AT ALL
8. MOVING OR SPEAKING SO SLOWLY THAT OTHER PEOPLE COULD HAVE NOTICED. OR THE OPPOSITE, BEING SO FIGETY OR RESTLESS THAT YOU HAVE BEEN MOVING AROUND A LOT MORE THAN USUAL: NOT AT ALL
4. FEELING TIRED OR HAVING LITTLE ENERGY: SEVERAL DAYS
SUM OF ALL RESPONSES TO PHQ9 QUESTIONS 1 AND 2: 0
9. THOUGHTS THAT YOU WOULD BE BETTER OFF DEAD, OR OF HURTING YOURSELF: NOT AT ALL
10. IF YOU CHECKED OFF ANY PROBLEMS, HOW DIFFICULT HAVE THESE PROBLEMS MADE IT FOR YOU TO DO YOUR WORK, TAKE CARE OF THINGS AT HOME, OR GET ALONG WITH OTHER PEOPLE: NOT DIFFICULT AT ALL
SUM OF ALL RESPONSES TO PHQ QUESTIONS 1-9: 1
5. POOR APPETITE OR OVEREATING: NOT AT ALL
1. LITTLE INTEREST OR PLEASURE IN DOING THINGS: NOT AT ALL
6. FEELING BAD ABOUT YOURSELF - OR THAT YOU ARE A FAILURE OR HAVE LET YOURSELF OR YOUR FAMILY DOWN: NOT AT ALL
7. TROUBLE CONCENTRATING ON THINGS, SUCH AS READING THE NEWSPAPER OR WATCHING TELEVISION: NOT AT ALL
2. FEELING DOWN, DEPRESSED OR HOPELESS: NOT AT ALL

## 2024-10-25 ASSESSMENT — ENCOUNTER SYMPTOMS
DEPRESSION: 0
OCCASIONAL FEELINGS OF UNSTEADINESS: 0
LOSS OF SENSATION IN FEET: 0

## 2024-10-25 NOTE — PROGRESS NOTES
Patient is here for medicare wellness, also review lab results, flu vaccine in left deltoid, patient did forget she did have flu vaccine in 09/2024.

## 2024-10-25 NOTE — PROGRESS NOTES
"Subjective   Patient ID: Danette Owens is a 79 y.o. female who presents for Medicare Annual Wellness Visit Subsequent.  Patient comes in for a physical examination, doing well over - all with no particular complaints.   Also in for laboratory review and health maintenance update.  Updating family history as well.          Review of Systems   All other systems reviewed and are negative.      Objective   Physical Exam  Constitutional:       Appearance: Normal appearance.   HENT:      Head: Normocephalic.   Eyes:      Extraocular Movements: Extraocular movements intact.      Conjunctiva/sclera: Conjunctivae normal.      Pupils: Pupils are equal, round, and reactive to light.   Cardiovascular:      Rate and Rhythm: Normal rate and regular rhythm.   Pulmonary:      Effort: Pulmonary effort is normal.      Breath sounds: Normal breath sounds.   Abdominal:      General: Abdomen is flat. Bowel sounds are normal.      Palpations: Abdomen is soft.   Musculoskeletal:         General: Normal range of motion.      Cervical back: Normal range of motion.   Skin:     General: Skin is warm and dry.   Neurological:      General: No focal deficit present.      Mental Status: She is alert and oriented to person, place, and time. Mental status is at baseline.   Psychiatric:         Mood and Affect: Mood normal.         Behavior: Behavior normal.       /70   Pulse 76   Temp 35.6 °C (96 °F) (Temporal)   Resp 16   Ht 1.626 m (5' 4\")   Wt 54.4 kg (120 lb)   SpO2 96%   BMI 20.60 kg/m²         Assessment/Plan   Problem List Items Addressed This Visit       Well adult exam - Primary     Other Visit Diagnoses       Flu vaccine need        Relevant Orders    Flu vaccine, trivalent, preservative free, HIGH-DOSE, age 65y+ (Fluzone) (Completed)        ASSESSMENT AND PLAN: Patient on examination is in fair health except for medical conditions discussed above, obtained screening blood tests to screen for high cholesterol, diabetes, " thyroid, Ekg if above 50 years old or earlier if with cardiac history. Patient should be taking enough calcium in a balanced diet  Weight control especially if BMI is above ideal range. For Female Patients Only:  Mammogram yearly and PAP test with gynecology unless s/p Total hysterectomy  Bone density test at age 60 and above and every two years after that. Preventive Medicine: colon cancer screening by age 45 if no family history as well PSA @ 50 , balanced diet, and exercise as discussed. Seat belt use for injury prevention, living will. Substance use and /or tobacco use counseled when applicable. Alcohol use discussed, use designated . Immunizations TD age 50 and every 10 years. Pneumovax and shingles vaccine counseled. Yearly flu vaccine unless contraindicated. More than 50% of office visit time spent counseling the patient, questions were answered. If problems arise, patient is to call or come back in. It is understood that the responsibility of healthcare is shared by the patient by following a healthy lifestyle and following the plan above as discussed. Advised complete physical examination every year. Pending additional results, may need to come for a return office visit for follow-up.

## 2024-12-24 ENCOUNTER — HOSPITAL ENCOUNTER (OUTPATIENT)
Dept: RADIOLOGY | Facility: CLINIC | Age: 79
Discharge: HOME | End: 2024-12-24
Payer: MEDICARE

## 2024-12-24 DIAGNOSIS — M81.0 AGE RELATED OSTEOPOROSIS, UNSPECIFIED PATHOLOGICAL FRACTURE PRESENCE: ICD-10-CM

## 2024-12-24 PROCEDURE — 77080 DXA BONE DENSITY AXIAL: CPT | Performed by: RADIOLOGY

## 2024-12-24 PROCEDURE — 77080 DXA BONE DENSITY AXIAL: CPT

## 2025-09-24 ENCOUNTER — APPOINTMENT (OUTPATIENT)
Dept: VASCULAR SURGERY | Facility: CLINIC | Age: 80
End: 2025-09-24
Payer: MEDICARE